# Patient Record
Sex: MALE | Race: WHITE | NOT HISPANIC OR LATINO | Employment: UNEMPLOYED | URBAN - NONMETROPOLITAN AREA
[De-identification: names, ages, dates, MRNs, and addresses within clinical notes are randomized per-mention and may not be internally consistent; named-entity substitution may affect disease eponyms.]

---

## 2017-01-24 ENCOUNTER — TRANSCRIBE ORDERS (OUTPATIENT)
Dept: ADMINISTRATIVE | Facility: HOSPITAL | Age: 67
End: 2017-01-24

## 2017-01-24 DIAGNOSIS — G93.89 SUPRASELLAR MASS: Primary | ICD-10-CM

## 2017-01-27 ENCOUNTER — HOSPITAL ENCOUNTER (OUTPATIENT)
Dept: MRI IMAGING | Facility: HOSPITAL | Age: 67
Discharge: HOME | End: 2017-01-27
Admitting: FAMILY MEDICINE

## 2017-01-27 DIAGNOSIS — G93.89 SUPRASELLAR MASS: ICD-10-CM

## 2017-01-27 PROCEDURE — 70552 MRI BRAIN STEM W/DYE: CPT | Performed by: RADIOLOGY

## 2017-01-27 PROCEDURE — 0 GADOBENATE DIMEGLUMINE 529 MG/ML SOLUTION: Performed by: FAMILY MEDICINE

## 2017-01-27 PROCEDURE — A9577 INJ MULTIHANCE: HCPCS | Performed by: FAMILY MEDICINE

## 2017-01-27 PROCEDURE — 70552 MRI BRAIN STEM W/DYE: CPT

## 2017-01-27 RX ADMIN — GADOBENATE DIMEGLUMINE 11 ML: 529 INJECTION, SOLUTION INTRAVENOUS at 14:45

## 2017-11-04 ENCOUNTER — HOSPITAL ENCOUNTER (EMERGENCY)
Facility: HOSPITAL | Age: 67
Discharge: HOME | End: 2017-11-04
Attending: EMERGENCY MEDICINE | Admitting: EMERGENCY MEDICINE

## 2017-11-04 ENCOUNTER — APPOINTMENT (OUTPATIENT)
Dept: GENERAL RADIOLOGY | Facility: HOSPITAL | Age: 67
End: 2017-11-04

## 2017-11-04 VITALS
TEMPERATURE: 98.5 F | BODY MASS INDEX: 19.49 KG/M2 | WEIGHT: 110 LBS | SYSTOLIC BLOOD PRESSURE: 121 MMHG | RESPIRATION RATE: 22 BRPM | OXYGEN SATURATION: 95 % | HEART RATE: 78 BPM | DIASTOLIC BLOOD PRESSURE: 65 MMHG | HEIGHT: 63 IN

## 2017-11-04 DIAGNOSIS — J06.9 UPPER RESPIRATORY TRACT INFECTION, UNSPECIFIED TYPE: Primary | ICD-10-CM

## 2017-11-04 LAB
ALBUMIN SERPL-MCNC: 4.2 G/DL (ref 3.5–5)
ALBUMIN/GLOB SERPL: 1.3 G/DL (ref 1–2)
ALP SERPL-CCNC: 115 U/L (ref 38–126)
ALT SERPL W P-5'-P-CCNC: 24 U/L (ref 13–69)
ANION GAP SERPL CALCULATED.3IONS-SCNC: 15.2 MMOL/L
AST SERPL-CCNC: 28 U/L (ref 15–46)
BASOPHILS # BLD AUTO: 0.05 10*3/MM3 (ref 0–0.2)
BASOPHILS NFR BLD AUTO: 0.5 % (ref 0–2.5)
BILIRUB SERPL-MCNC: 0.7 MG/DL (ref 0.2–1.3)
BUN BLD-MCNC: 17 MG/DL (ref 7–20)
BUN/CREAT SERPL: 17 (ref 6.3–21.9)
CALCIUM SPEC-SCNC: 9.1 MG/DL (ref 8.4–10.2)
CHLORIDE SERPL-SCNC: 105 MMOL/L (ref 98–107)
CO2 SERPL-SCNC: 25 MMOL/L (ref 26–30)
CREAT BLD-MCNC: 1 MG/DL (ref 0.6–1.3)
DEPRECATED RDW RBC AUTO: 42.5 FL (ref 37–54)
EOSINOPHIL # BLD AUTO: 1.04 10*3/MM3 (ref 0–0.7)
EOSINOPHIL NFR BLD AUTO: 10.3 % (ref 0–7)
ERYTHROCYTE [DISTWIDTH] IN BLOOD BY AUTOMATED COUNT: 13.4 % (ref 11.5–14.5)
FLUAV AG NPH QL: NEGATIVE
FLUBV AG NPH QL IA: NEGATIVE
GFR SERPL CREATININE-BSD FRML MDRD: 74 ML/MIN/1.73
GLOBULIN UR ELPH-MCNC: 3.2 GM/DL
GLUCOSE BLD-MCNC: 96 MG/DL (ref 74–98)
HCT VFR BLD AUTO: 37.3 % (ref 42–52)
HGB BLD-MCNC: 12.5 G/DL (ref 14–18)
IMM GRANULOCYTES # BLD: 0.03 10*3/MM3 (ref 0–0.06)
IMM GRANULOCYTES NFR BLD: 0.3 % (ref 0–0.6)
LYMPHOCYTES # BLD AUTO: 4.97 10*3/MM3 (ref 0.6–3.4)
LYMPHOCYTES NFR BLD AUTO: 49.4 % (ref 10–50)
MCH RBC QN AUTO: 28.9 PG (ref 27–31)
MCHC RBC AUTO-ENTMCNC: 33.5 G/DL (ref 30–37)
MCV RBC AUTO: 86.1 FL (ref 80–94)
MONOCYTES # BLD AUTO: 0.74 10*3/MM3 (ref 0–0.9)
MONOCYTES NFR BLD AUTO: 7.3 % (ref 0–12)
NEUTROPHILS # BLD AUTO: 3.24 10*3/MM3 (ref 2–6.9)
NEUTROPHILS NFR BLD AUTO: 32.2 % (ref 37–80)
NRBC BLD MANUAL-RTO: 0 /100 WBC (ref 0–0)
PLATELET # BLD AUTO: 254 10*3/MM3 (ref 130–400)
PMV BLD AUTO: 9.3 FL (ref 6–12)
POTASSIUM BLD-SCNC: 3.2 MMOL/L (ref 3.5–5.1)
PROT SERPL-MCNC: 7.4 G/DL (ref 6.3–8.2)
RBC # BLD AUTO: 4.33 10*6/MM3 (ref 4.7–6.1)
SODIUM BLD-SCNC: 142 MMOL/L (ref 137–145)
WBC NRBC COR # BLD: 10.07 10*3/MM3 (ref 4.8–10.8)

## 2017-11-04 PROCEDURE — 80053 COMPREHEN METABOLIC PANEL: CPT | Performed by: EMERGENCY MEDICINE

## 2017-11-04 PROCEDURE — 93005 ELECTROCARDIOGRAM TRACING: CPT | Performed by: EMERGENCY MEDICINE

## 2017-11-04 PROCEDURE — 96374 THER/PROPH/DIAG INJ IV PUSH: CPT

## 2017-11-04 PROCEDURE — 96361 HYDRATE IV INFUSION ADD-ON: CPT

## 2017-11-04 PROCEDURE — 85025 COMPLETE CBC W/AUTO DIFF WBC: CPT | Performed by: EMERGENCY MEDICINE

## 2017-11-04 PROCEDURE — 71020 HC CHEST PA AND LATERAL: CPT

## 2017-11-04 PROCEDURE — 87804 INFLUENZA ASSAY W/OPTIC: CPT | Performed by: EMERGENCY MEDICINE

## 2017-11-04 PROCEDURE — 94640 AIRWAY INHALATION TREATMENT: CPT

## 2017-11-04 PROCEDURE — 99284 EMERGENCY DEPT VISIT MOD MDM: CPT

## 2017-11-04 PROCEDURE — 25010000002 METHYLPREDNISOLONE PER 125 MG: Performed by: EMERGENCY MEDICINE

## 2017-11-04 RX ORDER — METHYLPREDNISOLONE SODIUM SUCCINATE 125 MG/2ML
125 INJECTION, POWDER, LYOPHILIZED, FOR SOLUTION INTRAMUSCULAR; INTRAVENOUS ONCE
Status: COMPLETED | OUTPATIENT
Start: 2017-11-04 | End: 2017-11-04

## 2017-11-04 RX ORDER — PREDNISONE 50 MG/1
50 TABLET ORAL DAILY
Qty: 4 TABLET | Refills: 0 | Status: SHIPPED | OUTPATIENT
Start: 2017-11-04 | End: 2017-11-04 | Stop reason: HOSPADM

## 2017-11-04 RX ORDER — IPRATROPIUM BROMIDE AND ALBUTEROL SULFATE 2.5; .5 MG/3ML; MG/3ML
6 SOLUTION RESPIRATORY (INHALATION) ONCE
Status: COMPLETED | OUTPATIENT
Start: 2017-11-04 | End: 2017-11-04

## 2017-11-04 RX ORDER — DOXYCYCLINE 100 MG/1
100 CAPSULE ORAL ONCE
Status: DISCONTINUED | OUTPATIENT
Start: 2017-11-04 | End: 2017-11-04 | Stop reason: HOSPADM

## 2017-11-04 RX ORDER — DOXYCYCLINE HYCLATE 50 MG/1
100 CAPSULE ORAL ONCE
Status: DISCONTINUED | OUTPATIENT
Start: 2017-11-04 | End: 2017-11-04 | Stop reason: SDUPTHER

## 2017-11-04 RX ORDER — PREDNISOLONE SODIUM PHOSPHATE 15 MG/5ML
30 SOLUTION ORAL DAILY
Qty: 40 ML | Refills: 0 | Status: SHIPPED | OUTPATIENT
Start: 2017-11-04 | End: 2017-11-08

## 2017-11-04 RX ORDER — DOXYCYCLINE 100 MG/1
100 CAPSULE ORAL 2 TIMES DAILY
Qty: 20 CAPSULE | Refills: 0 | Status: SHIPPED | OUTPATIENT
Start: 2017-11-04 | End: 2017-11-04 | Stop reason: HOSPADM

## 2017-11-04 RX ADMIN — IPRATROPIUM BROMIDE AND ALBUTEROL SULFATE 6 ML: .5; 3 SOLUTION RESPIRATORY (INHALATION) at 17:00

## 2017-11-04 RX ADMIN — SODIUM CHLORIDE 500 ML: 9 INJECTION, SOLUTION INTRAVENOUS at 17:38

## 2017-11-04 RX ADMIN — METHYLPREDNISOLONE SODIUM SUCCINATE 125 MG: 125 INJECTION, POWDER, FOR SOLUTION INTRAMUSCULAR; INTRAVENOUS at 17:38

## 2017-11-04 NOTE — ED PROVIDER NOTES
TRIAGE CHIEF COMPLAINT:     Nursing and triage notes reviewed    Chief Complaint   Patient presents with   • Wheezing      HPI: Bereket Amato is a 66 y.o. male who presents to the emergency department complaining of Cough, sputum production, and wheezing for the past 4 days.  Patient states symptoms seemed to gotten significantly worse yesterday evening.  Patient states he has had some chills at home and has been coughing up greenish yellow colored sputum over the past few days.  Patient states he heard some audible wheezing.  Patient states he has never been a smoker and denies any history of lung issues.  Did not take his temperature at home so is unsure.  Had a fever.  Denies any abdominal pain, nausea, or vomiting.  Patient denies any chest pain.     REVIEW OF SYSTEMS: All other systems reviewed and are negative     PAST MEDICAL HISTORY:   Past Medical History:   Diagnosis Date   • Brain tumor         FAMILY HISTORY:   Family History   Problem Relation Age of Onset   • No Known Problems Mother    • No Known Problems Father    • No Known Problems Sister    • No Known Problems Brother    • No Known Problems Son    • No Known Problems Daughter    • No Known Problems Maternal Grandmother    • No Known Problems Maternal Grandfather    • No Known Problems Paternal Grandmother    • No Known Problems Paternal Grandfather    • No Known Problems Cousin    • Rheum arthritis Neg Hx    • Osteoarthritis Neg Hx    • Asthma Neg Hx    • Diabetes Neg Hx    • Heart failure Neg Hx    • Hyperlipidemia Neg Hx    • Hypertension Neg Hx    • Migraines Neg Hx    • Rashes / Skin problems Neg Hx    • Seizures Neg Hx    • Stroke Neg Hx    • Thyroid disease Neg Hx         SOCIAL HISTORY:   Social History     Social History   • Marital status:      Spouse name: N/A   • Number of children: N/A   • Years of education: N/A     Occupational History   • Not on file.     Social History Main Topics   • Smoking status: Never Smoker   •  Smokeless tobacco: Never Used   • Alcohol use No   • Drug use: No   • Sexual activity: Defer     Other Topics Concern   • Not on file     Social History Narrative        SURGICAL HISTORY:   Past Surgical History:   Procedure Laterality Date   • BRAIN SURGERY          CURRENT MEDICATIONS:      Medication List      Notice     You have not been prescribed any medications.         ALLERGIES: Aspirin     PHYSICAL EXAM:   VITAL SIGNS:   Vitals:    11/04/17 1700   BP:    Pulse: 94   Resp: 22   Temp:    SpO2: 95%      CONSTITUTIONAL: Awake, oriented, appears non-toxic   HENT: Atraumatic, normocephalic, oral mucosa pink and moist, airway patent. Posterior pharynx normal in appearance.  EYES: Conjunctiva clear   NECK: Trachea midline   CARDIOVASCULAR: Normal heart rate, Normal rhythm, No murmurs, rubs, gallops   PULMONARY/CHEST: Diffuse wheezes in all lung fields, there is good air movement  ABDOMINAL: Non-distended, soft, non-tender - no rebound or guarding  NEUROLOGIC: Non-focal, moving all four extremities, no gross sensory or motor deficits.   EXTREMITIES: No clubbing, cyanosis, or edema   SKIN: Warm, Dry, No erythema, No rash     ED COURSE / MEDICAL DECISION MAKING:   Bereket Amato is a 66 y.o. male who presents to the emergency department for evaluation of cough, congestion, wheezing.  On arrival patient has stable vital signs and does not appear distressed.  He does have diffuse wheezes on auscultation.  We'll obtain a chest x-ray and basic labs for further evaluation.  Chest x-ray per my interpretation does not reveal any acute abnormalities other than some chronic findings.  Laboratory tests were largely unremarkable.  Patient significantly improved after IV steroids and breathing treatments.  Repeat evaluation revealed only a few faint scattered wheezes and improved air movement.  We'll treat the patient for an upper respiratory infection.  Return precautions discussed.    DECISION TO DISCHARGE/ADMIT: see ED care  timeline     FINAL IMPRESSION:   1 -- upper respiratory infection   2 --   3 --     Electronically signed by: Apoorva Cisse MD, 11/4/2017 5:13 PM       Apoorva Cisse MD  11/04/17 8055

## 2017-11-04 NOTE — ED NOTES
"Post neb tx and iv steriod, pt states he is breathing \" much better\".  Continuing to monitor pt closely.       Beckie Carreon RN  11/04/17 0336    "

## 2019-05-29 ENCOUNTER — APPOINTMENT (OUTPATIENT)
Dept: GENERAL RADIOLOGY | Facility: HOSPITAL | Age: 69
End: 2019-05-29

## 2019-05-29 ENCOUNTER — HOSPITAL ENCOUNTER (EMERGENCY)
Facility: HOSPITAL | Age: 69
Discharge: HOME | End: 2019-05-29
Attending: EMERGENCY MEDICINE | Admitting: EMERGENCY MEDICINE

## 2019-05-29 VITALS
RESPIRATION RATE: 16 BRPM | OXYGEN SATURATION: 99 % | WEIGHT: 112 LBS | TEMPERATURE: 98.1 F | BODY MASS INDEX: 19.84 KG/M2 | HEIGHT: 63 IN | SYSTOLIC BLOOD PRESSURE: 112 MMHG | HEART RATE: 78 BPM | DIASTOLIC BLOOD PRESSURE: 86 MMHG

## 2019-05-29 DIAGNOSIS — S62.327A CLOSED DISPLACED FRACTURE OF SHAFT OF FIFTH METACARPAL BONE OF LEFT HAND, INITIAL ENCOUNTER: Primary | ICD-10-CM

## 2019-05-29 PROCEDURE — 73130 X-RAY EXAM OF HAND: CPT

## 2019-05-29 PROCEDURE — 73562 X-RAY EXAM OF KNEE 3: CPT

## 2019-05-29 PROCEDURE — 99282 EMERGENCY DEPT VISIT SF MDM: CPT

## 2019-05-29 RX ORDER — HYDROCODONE BITARTRATE AND ACETAMINOPHEN 5; 325 MG/1; MG/1
1 TABLET ORAL EVERY 8 HOURS PRN
Qty: 9 TABLET | Refills: 0 | Status: SHIPPED | OUTPATIENT
Start: 2019-05-29

## 2019-10-19 ENCOUNTER — HOSPITAL ENCOUNTER (EMERGENCY)
Facility: HOSPITAL | Age: 69
Discharge: HOME | End: 2019-10-19
Attending: EMERGENCY MEDICINE | Admitting: EMERGENCY MEDICINE

## 2019-10-19 ENCOUNTER — APPOINTMENT (OUTPATIENT)
Dept: CT IMAGING | Facility: HOSPITAL | Age: 69
End: 2019-10-19

## 2019-10-19 ENCOUNTER — APPOINTMENT (OUTPATIENT)
Dept: GENERAL RADIOLOGY | Facility: HOSPITAL | Age: 69
End: 2019-10-19

## 2019-10-19 VITALS
SYSTOLIC BLOOD PRESSURE: 135 MMHG | DIASTOLIC BLOOD PRESSURE: 107 MMHG | RESPIRATION RATE: 18 BRPM | OXYGEN SATURATION: 98 % | HEIGHT: 63 IN | HEART RATE: 83 BPM | BODY MASS INDEX: 19.81 KG/M2 | TEMPERATURE: 97.9 F | WEIGHT: 111.8 LBS

## 2019-10-19 DIAGNOSIS — R10.13 EPIGASTRIC PAIN: Primary | ICD-10-CM

## 2019-10-19 DIAGNOSIS — K21.9 GASTROESOPHAGEAL REFLUX DISEASE, ESOPHAGITIS PRESENCE NOT SPECIFIED: ICD-10-CM

## 2019-10-19 LAB
ALBUMIN SERPL-MCNC: 3.7 G/DL (ref 3.5–5.2)
ALBUMIN/GLOB SERPL: 1 G/DL
ALP SERPL-CCNC: 110 U/L (ref 39–117)
ALT SERPL W P-5'-P-CCNC: 9 U/L (ref 1–41)
ANION GAP SERPL CALCULATED.3IONS-SCNC: 10.4 MMOL/L (ref 5–15)
AST SERPL-CCNC: 22 U/L (ref 1–40)
BASOPHILS # BLD AUTO: 0.04 10*3/MM3 (ref 0–0.2)
BASOPHILS NFR BLD AUTO: 0.7 % (ref 0–1.5)
BILIRUB SERPL-MCNC: 0.5 MG/DL (ref 0.2–1.2)
BUN BLD-MCNC: 20 MG/DL (ref 8–23)
BUN/CREAT SERPL: 19.6 (ref 7–25)
CALCIUM SPEC-SCNC: 9 MG/DL (ref 8.6–10.5)
CHLORIDE SERPL-SCNC: 102 MMOL/L (ref 98–107)
CO2 SERPL-SCNC: 23.6 MMOL/L (ref 22–29)
CREAT BLD-MCNC: 1.02 MG/DL (ref 0.76–1.27)
DEPRECATED RDW RBC AUTO: 44.3 FL (ref 37–54)
EOSINOPHIL # BLD AUTO: 0.32 10*3/MM3 (ref 0–0.4)
EOSINOPHIL NFR BLD AUTO: 5.6 % (ref 0.3–6.2)
ERYTHROCYTE [DISTWIDTH] IN BLOOD BY AUTOMATED COUNT: 13.7 % (ref 12.3–15.4)
GFR SERPL CREATININE-BSD FRML MDRD: 72 ML/MIN/1.73
GLOBULIN UR ELPH-MCNC: 3.7 GM/DL
GLUCOSE BLD-MCNC: 105 MG/DL (ref 65–99)
HCT VFR BLD AUTO: 35.8 % (ref 37.5–51)
HGB BLD-MCNC: 11.6 G/DL (ref 13–17.7)
IMM GRANULOCYTES # BLD AUTO: 0.02 10*3/MM3 (ref 0–0.05)
IMM GRANULOCYTES NFR BLD AUTO: 0.4 % (ref 0–0.5)
LIPASE SERPL-CCNC: 22 U/L (ref 13–60)
LYMPHOCYTES # BLD AUTO: 2.09 10*3/MM3 (ref 0.7–3.1)
LYMPHOCYTES NFR BLD AUTO: 36.8 % (ref 19.6–45.3)
MCH RBC QN AUTO: 28.6 PG (ref 26.6–33)
MCHC RBC AUTO-ENTMCNC: 32.4 G/DL (ref 31.5–35.7)
MCV RBC AUTO: 88.2 FL (ref 79–97)
MONOCYTES # BLD AUTO: 0.54 10*3/MM3 (ref 0.1–0.9)
MONOCYTES NFR BLD AUTO: 9.5 % (ref 5–12)
NEUTROPHILS # BLD AUTO: 2.67 10*3/MM3 (ref 1.7–7)
NEUTROPHILS NFR BLD AUTO: 47 % (ref 42.7–76)
NRBC BLD AUTO-RTO: 0 /100 WBC (ref 0–0.2)
PLATELET # BLD AUTO: 309 10*3/MM3 (ref 140–450)
PMV BLD AUTO: 8.6 FL (ref 6–12)
POTASSIUM BLD-SCNC: 3.7 MMOL/L (ref 3.5–5.2)
PROT SERPL-MCNC: 7.4 G/DL (ref 6–8.5)
RBC # BLD AUTO: 4.06 10*6/MM3 (ref 4.14–5.8)
SODIUM BLD-SCNC: 136 MMOL/L (ref 136–145)
TROPONIN T SERPL-MCNC: <0.01 NG/ML (ref 0–0.03)
WBC NRBC COR # BLD: 5.68 10*3/MM3 (ref 3.4–10.8)

## 2019-10-19 PROCEDURE — 80053 COMPREHEN METABOLIC PANEL: CPT | Performed by: PHYSICIAN ASSISTANT

## 2019-10-19 PROCEDURE — 85025 COMPLETE CBC W/AUTO DIFF WBC: CPT | Performed by: PHYSICIAN ASSISTANT

## 2019-10-19 PROCEDURE — 83690 ASSAY OF LIPASE: CPT | Performed by: PHYSICIAN ASSISTANT

## 2019-10-19 PROCEDURE — 96374 THER/PROPH/DIAG INJ IV PUSH: CPT

## 2019-10-19 PROCEDURE — 99283 EMERGENCY DEPT VISIT LOW MDM: CPT

## 2019-10-19 PROCEDURE — 71046 X-RAY EXAM CHEST 2 VIEWS: CPT

## 2019-10-19 PROCEDURE — 84484 ASSAY OF TROPONIN QUANT: CPT | Performed by: PHYSICIAN ASSISTANT

## 2019-10-19 PROCEDURE — 74176 CT ABD & PELVIS W/O CONTRAST: CPT

## 2019-10-19 PROCEDURE — 93005 ELECTROCARDIOGRAM TRACING: CPT | Performed by: PHYSICIAN ASSISTANT

## 2019-10-19 RX ORDER — PANTOPRAZOLE SODIUM 40 MG/1
40 TABLET, DELAYED RELEASE ORAL DAILY
Qty: 14 TABLET | Refills: 0 | Status: SHIPPED | OUTPATIENT
Start: 2019-10-19 | End: 2019-11-02

## 2019-10-19 RX ORDER — SUCRALFATE ORAL 1 G/10ML
1 SUSPENSION ORAL
Qty: 420 ML | Refills: 0 | Status: SHIPPED | OUTPATIENT
Start: 2019-10-19

## 2019-10-19 RX ORDER — FAMOTIDINE 10 MG/ML
20 INJECTION, SOLUTION INTRAVENOUS ONCE
Status: COMPLETED | OUTPATIENT
Start: 2019-10-19 | End: 2019-10-19

## 2019-10-19 RX ORDER — SODIUM CHLORIDE 0.9 % (FLUSH) 0.9 %
10 SYRINGE (ML) INJECTION AS NEEDED
Status: DISCONTINUED | OUTPATIENT
Start: 2019-10-19 | End: 2019-10-19 | Stop reason: HOSPADM

## 2019-10-19 RX ADMIN — FAMOTIDINE 20 MG: 10 INJECTION, SOLUTION INTRAVENOUS at 13:09

## 2019-10-19 RX ADMIN — SODIUM CHLORIDE 500 ML: 9 INJECTION, SOLUTION INTRAVENOUS at 13:04

## 2019-10-19 RX ADMIN — LIDOCAINE HYDROCHLORIDE: 20 SOLUTION ORAL; TOPICAL at 13:08

## 2019-10-19 NOTE — ED PROVIDER NOTES
"Subjective   Patient is a 68-year-old male with history of brain tumor and spinal fracture presenting to the ER for evaluation of abdominal pain.  States has been told he has a hernia in the past.  He states for the past week he has had intermittent epigastric pain that he describes as \"burning like fire\".  He states he has been taking over-the-counter Maalox at home without much relief.  He states eating food such as \"pepper and chips\" makes his pain worse.  He states he has had decreased p.o. intake due to this pain and has not had a bowel movement in the past 5 to 6 days.  He states that burning pain radiates up into his chest.  He denies any known cardiac history.  He denies any fever, chills, shortness of breath, productive cough, hematemesis, hemoptysis, nausea, emesis, diarrhea, melena, hematochezia, hematuria or dysuria.  He states he does believe he had a colonoscopy and endoscopy in the past.  He states that he is afraid this pain in his stomach is cancer.            Review of Systems   Constitutional: Negative for chills and fever.   HENT: Negative.    Eyes: Negative.    Respiratory: Negative.    Cardiovascular: Positive for chest pain.   Gastrointestinal: Positive for abdominal pain and constipation. Negative for diarrhea, nausea and vomiting.   Genitourinary: Negative.    Musculoskeletal: Negative.    Skin: Negative.    Allergic/Immunologic: Negative for immunocompromised state.   Neurological: Negative.    Psychiatric/Behavioral: Negative.        Past Medical History:   Diagnosis Date   • Brain tumor (CMS/HCC)    • Fracture    • History of spinal fracture        Allergies   Allergen Reactions   • Aspirin GI Intolerance       Past Surgical History:   Procedure Laterality Date   • BRAIN SURGERY     • FEMUR FRACTURE SURGERY     • TOTAL HIP ARTHROPLASTY         Family History   Problem Relation Age of Onset   • No Known Problems Mother    • No Known Problems Father    • No Known Problems Sister    • No " Known Problems Brother    • No Known Problems Son    • No Known Problems Daughter    • No Known Problems Maternal Grandmother    • No Known Problems Maternal Grandfather    • No Known Problems Paternal Grandmother    • No Known Problems Paternal Grandfather    • No Known Problems Cousin    • Rheum arthritis Neg Hx    • Osteoarthritis Neg Hx    • Asthma Neg Hx    • Diabetes Neg Hx    • Heart failure Neg Hx    • Hyperlipidemia Neg Hx    • Hypertension Neg Hx    • Migraines Neg Hx    • Rashes / Skin problems Neg Hx    • Seizures Neg Hx    • Stroke Neg Hx    • Thyroid disease Neg Hx        Social History     Socioeconomic History   • Marital status:      Spouse name: Not on file   • Number of children: Not on file   • Years of education: Not on file   • Highest education level: Not on file   Tobacco Use   • Smoking status: Never Smoker   • Smokeless tobacco: Never Used   Substance and Sexual Activity   • Alcohol use: No   • Drug use: No   • Sexual activity: Defer           Objective   Physical Exam   Nursing note and vitals reviewed.    GEN: Patient appears chronically ill and elderly.  He is sitting upright on the stretcher.  He is awake alert.  He is speaking in full sentences.  Head: Normocephalic, atraumatic  Eyes: EOM intact  Chest: Nontender to palpation  Cardiovascular: Regular rate and rhythm   Lungs: Clear to auscultation bilaterally without adventitious sounds  Abdomen: Scaphoid.  Patient does have a protrusion in the epigastric area that is reducible.  Bowel sounds are present in all quadrants.  Mildly tender to palpation epigastric region without guarding or rebound.  No lower abdominal tenderness  Extremities: No edema, normal appearance, full range of motion.  Radial and dorsalis pedis pulses are 2+ and equal  Neuro: GCS 15  Psych: Mood and affect are appropriate    Procedures           ED Course  ED Course as of Oct 19 1601   Sat Oct 19, 2019   1252 Patient states he does not want to give a urine  "sample.  [LA]   1309 WBC: 5.68 [LA]   1309 RBC: (!) 4.06 [LA]   1309 Hemoglobin: (!) 11.6 [LA]   1309 Platelets: 309 [LA]   1309 EKG interpreted by me.  Sinus rhythm.  Rate of 74.  Anterior fascicular block.  Nonspecific Q wave.  No ST segment or T wave abnormality.  Significant artifact present.  Abnormal EKG  [CG]   1335 Glucose: (!) 105 [LA]   1335 Creatinine: 1.02 [LA]   1335 Sodium: 136 [LA]   1335 Potassium: 3.7 [LA]   1335 Chloride: 102 [LA]   1335 ALT (SGPT): 9 [LA]   1335 AST (SGOT): 22 [LA]   1335 Alkaline Phosphatase: 110 [LA]   1335 Total Bilirubin: 0.5 [LA]   1335 eGFR Non  Am: 72 [LA]   1335 Lipase: 22 [LA]   1335 Troponin T: <0.010 [LA]   1350 [employer] tech informed me that patient is refusing contrast; states that it makes him \"sick\"  [LA]   1353 Narrative    PROCEDURE: XR CHEST 2 VW-     HISTORY: Epigastric pain     COMPARISON: 11/04/2017.     FINDINGS: Ventricular peritoneal shunt tubing noted over the left chest.  There is evidence of calcified granulomatous disease. No free air was  seen beneath the diaphragm. The heart is normal in size. The mediastinum  is unremarkable. The lungs are clear. There is no pneumothorax.  There  are no acute osseous abnormalities.       Impression    No acute cardiopulmonary process.     Continued followup is recommended.           This report was finalized on 10/19/2019 1:46 PM by Steven Soni DO.  [LA]   141 Narrative    PROCEDURE: CT ABDOMEN PELVIS WO CONTRAST-     HISTORY: Epigastric pain     COMPARISON: None .     PROCEDURE: Axial images were obtained from the lung bases through the  pubic symphysis without intravenous contrast. .      FINDINGS:      ABDOMEN: Lack of intravenous contrast limits evaluation of the solid  organs, the mediastinum, and the vasculature. The lung bases are  clear.The limited noncontrast images of the liver are normal. The  gallbladder is unremarkable. There is evidence of calcified  granulomatous disease. The spleen is normal. No " adrenal masses are seen.   The pancreas has an unremarkable unenhanced appearance.. There is no  nephrolithiasis. There is no hydronephrosis. There are bilateral  duplicated collecting systems. Compression fracture of L1 and L4 are age  indeterminate. Shunt tubing enters the left upper quadrant with the  distal tip in the right lower quadrant.The aorta is normal in caliber.  There is no significant free fluid or adenopathy.  Limited noncontrast  images of the bowel are unremarkable.       PELVIS: The appendix is not identified. The urinary bladder is  unremarkable.   Artifact is noted from bilateral hip arthroplasties. There is a small  amount of fluid in the pelvis. .       Impression    No obstructive uropathy.     Age-indeterminate compression fractures of L4 and L1.     Small amount of free fluid in the pelvis, findings could potentially be  secondary from shunt tubing.              344.81 mGy.cm.  7.94 mGy     This study was performed with techniques to keep radiation doses as low  as reasonably achievable (ALARA). Individualized dose reduction  techniques using automated exposure control or adjustment of mA and/or  kV according to the patient size were employed.      This report was finalized on 10/19/2019 2:14 PM by Steven Soni DO.  [LA]   1427 Patient states his stomach feels much better after medication.  Discussed case Dr. Díaz.  We will place him on Protonix and Carafate and give him surgery follow-up in case he needs endoscopy.  He states that he has to go because the family has to drive to [place].  I discussed follow-up with his primary care provider and strict return precautions to the ER.  He verbalized understanding and was in agreement with this plan of care  [LA]      ED Course User Index  [CG] Anderson Díaz DO  [LA] Sumaya Wilkinson PA-C                  MDM  Number of Diagnoses or Management Options  Epigastric pain:   Gastroesophageal reflux disease, esophagitis presence not  specified:   Diagnosis management comments: On arrival, patient has mildly elevated blood pressure but is afebrile, no acute distress. Patient is a poor historian.  Differential includes GERD, gastritis, pancreatitis, peptic ulcer disease, ACS, hiatal hernia, bowel obstruction, bowel perforation, colitis, and other concerns.  We will obtain basic labs including a lipase, troponin, UA, EKG, chest x-ray.  We will also obtain a CT of the abdomen pelvis to rule out any kind of obstruction.  Given patient's symptoms, sounds more consistent with GERD.  We will give Pepcid and a GI cocktail.    Troponin negative.  CBC and CMP stable, lipase is not elevated.  Patient refused to give urine sample.  EKG interpreted by Dr. Díaz.  X-ray read by radiologist with no acute findings.  CT of the abdomen pelvis revealed small amount of free fluid which is likely from the shunt tubing, and age-indeterminate compression fracture, and no other obstructive uropathy.  Compression fracture is known by the patient.  He feels much better after his medications; he states that he needs to leave at this time. Discussed the case with Dr. Díaz.  Will place patient on antacid medications and Carafate and have him follow-up with his primary care provider and general surgeon for possible endoscopy.  Discussed follow-up and strict return precautions.  Patient is requesting a give him medications to help him sleep, but I told him I could not do this and he is to follow-up with his primary care provider.       Amount and/or Complexity of Data Reviewed  Clinical lab tests: reviewed and ordered  Tests in the radiology section of CPT®: reviewed and ordered  Discussion of test results with the performing providers: yes  Decide to obtain previous medical records or to obtain history from someone other than the patient: yes  Review and summarize past medical records: yes  Discuss the patient with other providers: yes  Independent visualization of  images, tracings, or specimens: yes    Risk of Complications, Morbidity, and/or Mortality  Presenting problems: moderate  Diagnostic procedures: moderate  Management options: low    Patient Progress  Patient progress: improved      Final diagnoses:   Epigastric pain   Gastroesophageal reflux disease, esophagitis presence not specified              Sumaya Wilkinson PA-C  10/19/19 6086

## 2019-10-19 NOTE — DISCHARGE INSTRUCTIONS
You likely have irritation from your stomach from increased acid production.  Try to eat a bland diet and avoid spicy foods, chocolate, NSAIDs, caffeine.  Take Protonix to help reduce the acid.  Take Carafate as directed to help coat the lining of the stomach so it can heal.  Take all of your other medications as directed.  You will need to follow-up with your primary care provider in the next 1 to 2 days to reevaluate your symptoms.  If symptoms persist, you would likely need an endoscopy and may contact surgeon Dr. Chicas number provided.  Return to ER for any change, worsening symptoms, or any additional concerns include not limited to severe worsening abdominal pain or swelling, fever greater than 100.4, intractable vomiting, inability to pass flatulence, chest pain, difficulty breathing.

## 2020-05-03 ENCOUNTER — APPOINTMENT (OUTPATIENT)
Dept: GENERAL RADIOLOGY | Facility: HOSPITAL | Age: 70
End: 2020-05-03

## 2020-05-03 ENCOUNTER — HOSPITAL ENCOUNTER (EMERGENCY)
Facility: HOSPITAL | Age: 70
Discharge: HOME | End: 2020-05-03
Attending: EMERGENCY MEDICINE | Admitting: EMERGENCY MEDICINE

## 2020-05-03 VITALS
BODY MASS INDEX: 22.15 KG/M2 | WEIGHT: 125 LBS | DIASTOLIC BLOOD PRESSURE: 71 MMHG | HEIGHT: 63 IN | OXYGEN SATURATION: 96 % | HEART RATE: 89 BPM | RESPIRATION RATE: 22 BRPM | SYSTOLIC BLOOD PRESSURE: 101 MMHG | TEMPERATURE: 98.1 F

## 2020-05-03 DIAGNOSIS — S93.401A SPRAIN OF RIGHT ANKLE, UNSPECIFIED LIGAMENT, INITIAL ENCOUNTER: Primary | ICD-10-CM

## 2020-05-03 PROCEDURE — 73610 X-RAY EXAM OF ANKLE: CPT

## 2020-05-03 PROCEDURE — 99283 EMERGENCY DEPT VISIT LOW MDM: CPT

## 2020-05-03 RX ORDER — TRAMADOL HYDROCHLORIDE 50 MG/1
50 TABLET ORAL EVERY 8 HOURS PRN
Qty: 6 TABLET | Refills: 0 | Status: SHIPPED | OUTPATIENT
Start: 2020-05-03

## 2020-05-03 RX ORDER — HYDROCODONE BITARTRATE AND ACETAMINOPHEN 5; 325 MG/1; MG/1
1 TABLET ORAL ONCE
Status: COMPLETED | OUTPATIENT
Start: 2020-05-03 | End: 2020-05-03

## 2020-05-03 RX ADMIN — HYDROCODONE BITARTRATE AND ACETAMINOPHEN 1 TABLET: 5; 325 TABLET ORAL at 16:41

## 2020-05-03 NOTE — ED NOTES
Patient requesting a prescription for Lortab.  Dr. Farooq giving prescription for Tramadol.  Pt states he cannot take Tramadol because his primary doctor says not to.  [facility] notified and offered to write a Rx for nausea medicine.  Patient still requesting Rx for Lortab.  RN placed ortho boot on patient and he did not like wearing it.  Asked for it to be removed.  Then put his shoes on and left with Rx for Tramadol.     Rafa German, RN  05/03/20 4164

## 2020-05-03 NOTE — ED PROVIDER NOTES
Subjective   History of Present Illness    Chief Complaint: Right ankle pain left elbow pain status post fall 1 week ago  History of Present Illness: 69-year-old male fell 1 week ago tripped on a rock.  States he had persistent pain.  Primarily to the right ankle  Onset: 1 week  Duration: Single episode persistent symptoms  Exacerbating / Alleviating factors: Worse with weightbearing, using crutches at home  Associated symptoms: None      Nurses Notes reviewed and agree, including vitals, allergies, social history and prior medical history.     REVIEW OF SYSTEMS: All systems reviewed and not pertinent unless noted.    Positive for: Right lateral and dorsal ankle pain status post fall approximately 1 week ago    Negative for: Head injury sick contacts fever travel  Review of Systems    Past Medical History:   Diagnosis Date   • Brain tumor (CMS/HCC)    • Fracture    • History of spinal fracture        Allergies   Allergen Reactions   • Nsaids Other (See Comments)     Cannot take due to history of ulcers.    • Aspirin GI Intolerance       Past Surgical History:   Procedure Laterality Date   • BRAIN SURGERY     • FEMUR FRACTURE SURGERY     • TOTAL HIP ARTHROPLASTY         Family History   Problem Relation Age of Onset   • No Known Problems Mother    • No Known Problems Father    • No Known Problems Sister    • No Known Problems Brother    • No Known Problems Son    • No Known Problems Daughter    • No Known Problems Maternal Grandmother    • No Known Problems Maternal Grandfather    • No Known Problems Paternal Grandmother    • No Known Problems Paternal Grandfather    • No Known Problems Cousin    • Rheum arthritis Neg Hx    • Osteoarthritis Neg Hx    • Asthma Neg Hx    • Diabetes Neg Hx    • Heart failure Neg Hx    • Hyperlipidemia Neg Hx    • Hypertension Neg Hx    • Migraines Neg Hx    • Rashes / Skin problems Neg Hx    • Seizures Neg Hx    • Stroke Neg Hx    • Thyroid disease Neg Hx        Social History      Socioeconomic History   • Marital status:      Spouse name: Not on file   • Number of children: Not on file   • Years of education: Not on file   • Highest education level: Not on file   Tobacco Use   • Smoking status: Never Smoker   • Smokeless tobacco: Never Used   Substance and Sexual Activity   • Alcohol use: No   • Drug use: No   • Sexual activity: Defer           Objective   Physical Exam  GENERAL APPEARANCE: Well developed, well nourished, in no acute distress.  VITAL SIGNS: per nursing, reviewed and noted  SKIN: Mild distal medial right lower extremity rash consistent with contact dermatitis  Head: Normocephalic, atraumatic.   EYES:  EOMI.  LUNGS: No increased work of breathing. No retractions.   CARDIOVASCULAR:  regular rate and rhythm, no murmurs.  Good Peripheral pulses. Good capillary refill.   MUSCULOSKELETAL: No compartment syndrome. Intact sensation full range of motion left elbow.  Mild tenderness without soft tissue swelling.  Lateral right ankle tenderness to palpation good distal pulses good cap refill.  NEUROLOGIC: Alert, oriented x 3. No gross deficits.  Full range of motion of the foot  NECK: Supple, symmetric. No tenderness, Full ROM  Back: full rom, no paraspinal spasm.     Procedures     No attending provider procedures were performed      ED Course  ED Course as of May 03 1744   Sun May 03, 2020   1515 Consent obtained. [facility] report requested, number [id number]     [PF]      ED Course User Index  [PF] Puneet Farooq DO           Right ankle x-ray interpreted by me reveals no obvious fractures or dislocation.                                MDM  Patient placed and walking boot.  Advised continue crutches and outpatient orthopedic follow-up as needed.  Short course of Ultram.  Return precautions discussed.  Final diagnoses:   Sprain of right ankle, unspecified ligament, initial encounter            Puneet Farooq DO  05/03/20 0234

## 2021-02-19 ENCOUNTER — TRANSCRIBE ORDERS (OUTPATIENT)
Dept: LAB | Facility: HOSPITAL | Age: 71
End: 2021-02-19

## 2021-02-19 ENCOUNTER — HOSPITAL ENCOUNTER (OUTPATIENT)
Dept: GENERAL RADIOLOGY | Facility: HOSPITAL | Age: 71
Discharge: HOME | End: 2021-02-19
Admitting: FAMILY MEDICINE

## 2021-02-19 DIAGNOSIS — S42.90XA: ICD-10-CM

## 2021-02-19 DIAGNOSIS — S42.90XA: Primary | ICD-10-CM

## 2021-02-19 PROCEDURE — 73030 X-RAY EXAM OF SHOULDER: CPT

## 2021-02-19 PROCEDURE — 73030 X-RAY EXAM OF SHOULDER: CPT | Performed by: RADIOLOGY

## 2022-02-21 ENCOUNTER — HOSPITAL ENCOUNTER (OUTPATIENT)
Dept: CT IMAGING | Facility: HOSPITAL | Age: 72
Discharge: HOME | End: 2022-02-21
Payer: MEDICARE

## 2022-02-21 DIAGNOSIS — T14.8XXA CLOSED FRACTURE: ICD-10-CM

## 2022-02-21 DIAGNOSIS — S42.201B OPEN FRACTURE OF PROXIMAL END OF RIGHT HUMERUS, UNSPECIFIED FRACTURE MORPHOLOGY, INITIAL ENCOUNTER: ICD-10-CM

## 2022-02-21 PROCEDURE — 73200 CT UPPER EXTREMITY W/O DYE: CPT
